# Patient Record
Sex: FEMALE
[De-identification: names, ages, dates, MRNs, and addresses within clinical notes are randomized per-mention and may not be internally consistent; named-entity substitution may affect disease eponyms.]

---

## 2022-11-14 ENCOUNTER — NON-APPOINTMENT (OUTPATIENT)
Age: 32
End: 2022-11-14

## 2022-12-05 ENCOUNTER — EMERGENCY (EMERGENCY)
Facility: HOSPITAL | Age: 32
LOS: 1 days | Discharge: HOME | End: 2022-12-05
Attending: EMERGENCY MEDICINE | Admitting: EMERGENCY MEDICINE

## 2022-12-05 VITALS
OXYGEN SATURATION: 97 % | HEART RATE: 118 BPM | SYSTOLIC BLOOD PRESSURE: 114 MMHG | RESPIRATION RATE: 20 BRPM | TEMPERATURE: 98 F | DIASTOLIC BLOOD PRESSURE: 74 MMHG | HEIGHT: 64 IN | WEIGHT: 115.08 LBS

## 2022-12-05 DIAGNOSIS — N39.0 URINARY TRACT INFECTION, SITE NOT SPECIFIED: ICD-10-CM

## 2022-12-05 DIAGNOSIS — Z20.822 CONTACT WITH AND (SUSPECTED) EXPOSURE TO COVID-19: ICD-10-CM

## 2022-12-05 DIAGNOSIS — J11.1 INFLUENZA DUE TO UNIDENTIFIED INFLUENZA VIRUS WITH OTHER RESPIRATORY MANIFESTATIONS: ICD-10-CM

## 2022-12-05 DIAGNOSIS — Z88.1 ALLERGY STATUS TO OTHER ANTIBIOTIC AGENTS STATUS: ICD-10-CM

## 2022-12-05 DIAGNOSIS — R50.9 FEVER, UNSPECIFIED: ICD-10-CM

## 2022-12-05 PROCEDURE — 99284 EMERGENCY DEPT VISIT MOD MDM: CPT | Mod: 25

## 2022-12-05 PROCEDURE — 71046 X-RAY EXAM CHEST 2 VIEWS: CPT | Mod: 26

## 2022-12-05 NOTE — ED ADULT TRIAGE NOTE - CHIEF COMPLAINT QUOTE
Pt walked into ER c/o fever and cough since this morning worsening tonight after sore throat yesterday. Pt took Tylenol 500mg x2 PTA appx 2030. Denies PMH.

## 2022-12-06 VITALS
HEART RATE: 109 BPM | SYSTOLIC BLOOD PRESSURE: 107 MMHG | OXYGEN SATURATION: 96 % | DIASTOLIC BLOOD PRESSURE: 73 MMHG | RESPIRATION RATE: 18 BRPM | TEMPERATURE: 98 F

## 2022-12-06 LAB
APPEARANCE UR: SIGNIFICANT CHANGE UP
BACTERIA # UR AUTO: ABNORMAL /HPF
BILIRUB UR-MCNC: NEGATIVE — SIGNIFICANT CHANGE UP
COLOR SPEC: YELLOW — SIGNIFICANT CHANGE UP
COMMENT - URINE: SIGNIFICANT CHANGE UP
DIFF PNL FLD: NEGATIVE — SIGNIFICANT CHANGE UP
EPI CELLS # UR: ABNORMAL /HPF (ref 0–5)
FLUAV AG NPH QL: DETECTED
FLUBV AG NPH QL: SIGNIFICANT CHANGE UP
GLUCOSE UR QL: NEGATIVE — SIGNIFICANT CHANGE UP
HCG UR QL: NEGATIVE — SIGNIFICANT CHANGE UP
KETONES UR-MCNC: NEGATIVE — SIGNIFICANT CHANGE UP
LEUKOCYTE ESTERASE UR-ACNC: ABNORMAL
NITRITE UR-MCNC: POSITIVE
PH UR: 7.5 — SIGNIFICANT CHANGE UP (ref 5–8)
PROT UR-MCNC: NEGATIVE MG/DL — SIGNIFICANT CHANGE UP
RBC CASTS # UR COMP ASSIST: ABNORMAL /HPF
RSV RNA NPH QL NAA+NON-PROBE: SIGNIFICANT CHANGE UP
SARS-COV-2 RNA SPEC QL NAA+PROBE: SIGNIFICANT CHANGE UP
SP GR SPEC: 1.01 — SIGNIFICANT CHANGE UP (ref 1–1.03)
UROBILINOGEN FLD QL: 0.2 E.U./DL — SIGNIFICANT CHANGE UP
WBC UR QL: > 10 /HPF

## 2022-12-06 PROCEDURE — 71046 X-RAY EXAM CHEST 2 VIEWS: CPT | Mod: 26

## 2022-12-06 RX ORDER — CEFUROXIME AXETIL 250 MG
1 TABLET ORAL
Qty: 10 | Refills: 0
Start: 2022-12-06 | End: 2022-12-10

## 2022-12-06 RX ORDER — IBUPROFEN 200 MG
600 TABLET ORAL ONCE
Refills: 0 | Status: COMPLETED | OUTPATIENT
Start: 2022-12-06 | End: 2022-12-06

## 2022-12-06 RX ORDER — CEFUROXIME AXETIL 250 MG
250 TABLET ORAL ONCE
Refills: 0 | Status: COMPLETED | OUTPATIENT
Start: 2022-12-06 | End: 2022-12-06

## 2022-12-06 RX ADMIN — Medication 100 MILLIGRAM(S): at 00:19

## 2022-12-06 RX ADMIN — Medication 600 MILLIGRAM(S): at 00:19

## 2022-12-06 RX ADMIN — Medication 75 MILLIGRAM(S): at 01:58

## 2022-12-06 RX ADMIN — Medication 250 MILLIGRAM(S): at 01:57

## 2022-12-06 NOTE — ED PROVIDER NOTE - ENMT, MLM
Airway patent, Nasal mucosa Inflammed w clear dc, Mouth with normal mucosa. Throat has no vesicles, no oropharyngeal exudates and uvula is midline.

## 2022-12-06 NOTE — ED PROVIDER NOTE - PATIENT PORTAL LINK FT
You can access the FollowMyHealth Patient Portal offered by [facility] by registering at the following website: [email or web address]. By joining [facility]’s FollowMyHealth portal, you will also be able to view your health information using other applications (apps) compatible with our system.

## 2022-12-06 NOTE — ED PROVIDER NOTE - PROGRESS NOTE DETAILS
labs w +influenza and UA sugestive of UTI. Started ceftin for UTI and tamiflu for influenza. Pt is well appearing. Discussed supportive tx otherwise. RTER if worsening symptoms

## 2022-12-06 NOTE — ED PROVIDER NOTE - NS ED ATTENDING STATEMENT MOD
Attending Only
Airway patent, Nasal mucosa clear. Mouth with normal mucosa. Throat has no vesicles, no oropharyngeal exudates and uvula is midline.

## 2022-12-06 NOTE — ED PROVIDER NOTE - CLINICAL SUMMARY MEDICAL DECISION MAKING FREE TEXT BOX
pt is well appearing on initial evaluation, likely developing URI given nasal congestion, sore throat and absence of any other symptoms. no dysuria, cough is dry and not productive of any sputum. Will get swab, CXR and otherwise provide symptomatic tx.

## 2022-12-06 NOTE — ED PROVIDER NOTE - OBJECTIVE STATEMENT
31 yo female pt, no hx of med problems, allergic to septra. Pt presents for fever 102-104 earlier. pt started experiencing sore throat, nasal congestion, runny nose and dry cough yesterday and today developed fever. Has been vaccinated for covid and flu. sick contacts? no sob, no leg swelling. decreased apetite.

## 2022-12-08 ENCOUNTER — NON-APPOINTMENT (OUTPATIENT)
Age: 32
End: 2022-12-08

## 2022-12-08 LAB
-  AMIKACIN: SIGNIFICANT CHANGE UP
-  AMOXICILLIN/CLAVULANIC ACID: SIGNIFICANT CHANGE UP
-  AMPICILLIN/SULBACTAM: SIGNIFICANT CHANGE UP
-  AMPICILLIN: SIGNIFICANT CHANGE UP
-  AZTREONAM: SIGNIFICANT CHANGE UP
-  CEFAZOLIN: SIGNIFICANT CHANGE UP
-  CEFEPIME: SIGNIFICANT CHANGE UP
-  CEFOXITIN: SIGNIFICANT CHANGE UP
-  CEFTRIAXONE: SIGNIFICANT CHANGE UP
-  CIPROFLOXACIN: SIGNIFICANT CHANGE UP
-  ERTAPENEM: SIGNIFICANT CHANGE UP
-  GENTAMICIN: SIGNIFICANT CHANGE UP
-  IMIPENEM: SIGNIFICANT CHANGE UP
-  LEVOFLOXACIN: SIGNIFICANT CHANGE UP
-  MEROPENEM: SIGNIFICANT CHANGE UP
-  NITROFURANTOIN: SIGNIFICANT CHANGE UP
-  PIPERACILLIN/TAZOBACTAM: SIGNIFICANT CHANGE UP
-  TOBRAMYCIN: SIGNIFICANT CHANGE UP
-  TRIMETHOPRIM/SULFAMETHOXAZOLE: SIGNIFICANT CHANGE UP
CULTURE RESULTS: SIGNIFICANT CHANGE UP
METHOD TYPE: SIGNIFICANT CHANGE UP
ORGANISM # SPEC MICROSCOPIC CNT: SIGNIFICANT CHANGE UP
ORGANISM # SPEC MICROSCOPIC CNT: SIGNIFICANT CHANGE UP
SPECIMEN SOURCE: SIGNIFICANT CHANGE UP

## 2023-06-27 ENCOUNTER — NON-APPOINTMENT (OUTPATIENT)
Age: 33
End: 2023-06-27

## 2023-07-27 ENCOUNTER — NON-APPOINTMENT (OUTPATIENT)
Age: 33
End: 2023-07-27

## 2023-09-10 ENCOUNTER — NON-APPOINTMENT (OUTPATIENT)
Age: 33
End: 2023-09-10

## 2024-01-02 PROBLEM — Z00.00 ENCOUNTER FOR PREVENTIVE HEALTH EXAMINATION: Status: ACTIVE | Noted: 2024-01-02

## 2024-02-05 ENCOUNTER — APPOINTMENT (OUTPATIENT)
Dept: PSYCHIATRY | Facility: CLINIC | Age: 34
End: 2024-02-05
Payer: SELF-PAY

## 2024-02-05 PROCEDURE — 90837 PSYTX W PT 60 MINUTES: CPT | Mod: 95

## 2024-02-07 NOTE — PHYSICAL EXAM
[Well groomed] : well groomed [Average] : average [Cooperative] : cooperative [Euthymic] : euthymic [Full] : full [Clear] : clear [Linear/Goal Directed] : linear/goal directed [None] : none [None Reported] : none reported [WNL] : within normal limits [FreeTextEntry8] : some stress and anxiety [de-identified] : congruent to mood

## 2024-02-07 NOTE — REASON FOR VISIT
[Patient preference] : as per patient preference [Telehealth (audio & video) - Individual/Group] : This visit was provided via telehealth using real-time 2-way audio visual technology. [Other Location: e.g. Home (Enter Location, City,State)___] : The provider was located at [unfilled]. [Home] : The patient, [unfilled], was located at home, [unfilled], at the time of the visit. [Verbal consent obtained from patient/other participant(s)] : Verbal consent for telehealth/telephonic services obtained from patient/other participant(s) [FreeTextEntry4] : 11:03am [FreeTextEntry5] : 11:59pm [Patient] : Patient [FreeTextEntry1] : PTSD sx's; interpersonal stressors

## 2024-02-07 NOTE — PLAN
[FreeTextEntry2] : Writer and pt collaboratively identified treatment goals which will be assessed and adjusted as needed and clinically indicated.  Problem 1: PTSD sx (hypervigilance, irritability, paranoia)  Goal 1: decrease PTSD symptoms by 30% as evidenced by PCL-5  Problem 2: Interpersonal effectiveness Goal 2: develop and utilize strategies to decrease paranoia in romantic relationship and improve interpersonal communications/skills  [Dialectical Behavior Therapy] : Dialectical Behavior Therapy  [de-identified] : This writer met with patient for individual therapy. Patient reported feeling some anxiety and nervousness around upcoming exam, though noted significant increase in her confidence. Writer praised pt and encouraged this confidence. Session focused on chain analysis of recent event that took place with pt's partner. Identified thoughts and feelings as well as moments in which pt experienced perceived rejection and lack care. Identified strategy that patient can utilize in addition to STOP skill and calling out the stories that she creates in her mind. Pt stated that she is going out of town for 2 weeks. Next session scheduled upon her return. [Recommended Frequency of Visits: ____] : Recommended frequency of visits: [unfilled] [Return in ____ week(s)] : Return in [unfilled] week(s)

## 2024-02-07 NOTE — RISK ASSESSMENT
[No, patient denies ideation or behavior] : No, patient denies ideation or behavior [FreeTextEntry8] : pt denied any current SIIP [FreeTextEntry7] : pt denied any current HIIP [No] : No

## 2024-02-21 ENCOUNTER — APPOINTMENT (OUTPATIENT)
Dept: PSYCHIATRY | Facility: CLINIC | Age: 34
End: 2024-02-21
Payer: SELF-PAY

## 2024-02-21 PROCEDURE — 90837 PSYTX W PT 60 MINUTES: CPT | Mod: 95

## 2024-02-28 ENCOUNTER — APPOINTMENT (OUTPATIENT)
Dept: PSYCHIATRY | Facility: CLINIC | Age: 34
End: 2024-02-28
Payer: MEDICAID

## 2024-02-28 PROCEDURE — 90837 PSYTX W PT 60 MINUTES: CPT | Mod: 95

## 2024-02-28 NOTE — REASON FOR VISIT
[Patient preference] : as per patient preference [Telehealth (audio & video) - Individual/Group] : This visit was provided via telehealth using real-time 2-way audio visual technology. [Other Location: e.g. Home (Enter Location, City,State)___] : The provider was located at [unfilled]. [Home] : The patient, [unfilled], was located at home, [unfilled], at the time of the visit. [Verbal consent obtained from patient/other participant(s)] : Verbal consent for telehealth/telephonic services obtained from patient/other participant(s) [FreeTextEntry4] : 11:01am [FreeTextEntry5] : 12:00pm [Patient] : Patient [FreeTextEntry1] : interpersonal stresors; stress/anxiety

## 2024-02-28 NOTE — PLAN
[FreeTextEntry2] : Writer and pt collaboratively identified treatment goals which will be assessed and adjusted as needed and clinically indicated.  Problem 1: PTSD sx (hypervigilance, irritability, paranoia)  Goal 1: decrease PTSD symptoms by 30% as evidenced by PCL-5  Problem 2: Interpersonal effectiveness Goal 2: develop and utilize strategies to decrease paranoia in romantic relationship and improve interpersonal communications/skills  [Cognitive and/or Behavior Therapy] : Cognitive and/or Behavior Therapy  [de-identified] : This writer met with patient for individual therapy. Pt shared that she is feeling burnout related to studying and maintaining focus on exam. Offered space to process emotions and thoughts related. Reiterated plan and strategies to utilize to move forward. Provided motivation and encouragement. Additionally, processed recent situations that occurred with partner in which pt felt ignored or not valued/ cared about. Identified effective means of communicating these observations and feelings to partner as well as potential solutions to minimize issue. Discussed effectiveness of recently focusing less on what her partner is/isnt doing- to which pt noted this felt helpful and "freeing." Assigned patient homework to continue practicing new approach an increasing awareness around how this impacts her mood in context of relationship. [Recommended Frequency of Visits: ____] : Recommended frequency of visits: [unfilled] [Return in ____ week(s)] : Return in [unfilled] week(s)

## 2024-02-28 NOTE — PHYSICAL EXAM
[Well groomed] : well groomed [Average] : average [Cooperative] : cooperative [Euthymic] : euthymic [Full] : full [Clear] : clear [Linear/Goal Directed] : linear/goal directed [None] : none [None Reported] : none reported [WNL] : within normal limits [FreeTextEntry8] : some stress and anxiety [de-identified] : congruent to mood

## 2024-03-05 ENCOUNTER — APPOINTMENT (OUTPATIENT)
Dept: PSYCHIATRY | Facility: CLINIC | Age: 34
End: 2024-03-05
Payer: SELF-PAY

## 2024-03-05 PROCEDURE — 90837 PSYTX W PT 60 MINUTES: CPT | Mod: 95

## 2024-03-05 NOTE — PHYSICAL EXAM
[Well groomed] : well groomed [Average] : average [Cooperative] : cooperative [Euthymic] : euthymic [Full] : full [Clear] : clear [Linear/Goal Directed] : linear/goal directed [None] : none [None Reported] : none reported [WNL] : within normal limits [FreeTextEntry8] : some stress and anxiety [de-identified] : congruent to mood

## 2024-03-05 NOTE — REASON FOR VISIT
[Patient preference] : as per patient preference [Telehealth (audio & video) - Individual/Group] : This visit was provided via telehealth using real-time 2-way audio visual technology. [Other Location: e.g. Home (Enter Location, City,State)___] : The provider was located at [unfilled]. [Home] : The patient, [unfilled], was located at home, [unfilled], at the time of the visit. [Verbal consent obtained from patient/other participant(s)] : Verbal consent for telehealth/telephonic services obtained from patient/other participant(s) [FreeTextEntry4] : 10:01am [FreeTextEntry5] : 11:00am [Patient] : Patient [FreeTextEntry1] : interpersonal stressors

## 2024-03-05 NOTE — PLAN
[FreeTextEntry2] : Writer and pt collaboratively identified treatment goals which will be assessed and adjusted as needed and clinically indicated.  Problem 1: PTSD sx (hypervigilance, irritability, paranoia)  Goal 1: decrease PTSD symptoms by 30% as evidenced by PCL-5  Problem 2: Interpersonal effectiveness Goal 2: develop and utilize strategies to decrease paranoia in romantic relationship and improve interpersonal communications/skills  [Cognitive and/or Behavior Therapy] : Cognitive and/or Behavior Therapy  [de-identified] : This writer met with patient for individual therapy. Session focused on processing recent situations that occurred with partner in which pt felt lack of partnership. Reflectively listened and offered space for patient to process thoughts and feelings. Identified effective means of communicating these observations and feelings to partner. Will continue to explore and process in follow-up. [Recommended Frequency of Visits: ____] : Recommended frequency of visits: [unfilled] [Return in ____ week(s)] : Return in [unfilled] week(s)

## 2024-03-05 NOTE — END OF VISIT
[Individual Psychotherapy for 53+ minutes] : Individual Psychotherapy for 53+ minutes  [Duration of Psychotherapy Visit (minutes spent in synchronous communication): ____] : Duration of Psychotherapy Visit (minutes spent in synchronous communication): [unfilled] [Licensed Clinician] : Licensed Clinician

## 2024-03-15 ENCOUNTER — APPOINTMENT (OUTPATIENT)
Dept: PSYCHIATRY | Facility: CLINIC | Age: 34
End: 2024-03-15
Payer: MEDICAID

## 2024-03-15 PROCEDURE — 90837 PSYTX W PT 60 MINUTES: CPT | Mod: 95

## 2024-03-15 NOTE — PLAN
[FreeTextEntry2] : Writer and pt collaboratively identified treatment goals which will be assessed and adjusted as needed and clinically indicated.  Problem 1: PTSD sx (hypervigilance, irritability, paranoia)  Goal 1: decrease PTSD symptoms by 30% as evidenced by PCL-5  Problem 2: Interpersonal effectiveness Goal 2: develop and utilize strategies to decrease paranoia in romantic relationship and improve interpersonal communications/skills  [Cognitive and/or Behavior Therapy] : Cognitive and/or Behavior Therapy  [de-identified] : This writer met with patient for individual therapy. Session focused on further processing recent situations that occurred with partner in which pt felt lack of partnership. Processed areas in which pt noted lack of partnership in entirety of relationship. Themes of trust were explored. Provided psychoeducation around trust not being all or nothing. Reflectively listened and offered space for patient to process thoughts and feelings. Identified effective means of communicating these observations and feelings to partner. Encouraged pt to journal around what she can and cannot trust her partner with. Will continue to explore and process in follow-up. [Recommended Frequency of Visits: ____] : Recommended frequency of visits: [unfilled] [Return in ____ week(s)] : Return in [unfilled] week(s)

## 2024-03-15 NOTE — PHYSICAL EXAM
[Well groomed] : well groomed [Average] : average [Cooperative] : cooperative [Euthymic] : euthymic [Full] : full [Clear] : clear [Linear/Goal Directed] : linear/goal directed [None] : none [None Reported] : none reported [WNL] : within normal limits [FreeTextEntry8] : some stress and anxiety [de-identified] : congruent to mood

## 2024-03-15 NOTE — REASON FOR VISIT
[Patient preference] : as per patient preference [Telehealth (audio & video) - Individual/Group] : This visit was provided via telehealth using real-time 2-way audio visual technology. [Other Location: e.g. Home (Enter Location, City,State)___] : The provider was located at [unfilled]. [Home] : The patient, [unfilled], was located at home, [unfilled], at the time of the visit. [Verbal consent obtained from patient/other participant(s)] : Verbal consent for telehealth/telephonic services obtained from patient/other participant(s) [FreeTextEntry4] : 12:00pm [Patient] : Patient [FreeTextEntry5] : 1:00pm [FreeTextEntry1] : relation stress; betrayal

## 2024-03-19 ENCOUNTER — APPOINTMENT (OUTPATIENT)
Dept: PSYCHIATRY | Facility: CLINIC | Age: 34
End: 2024-03-19
Payer: MEDICAID

## 2024-03-19 PROCEDURE — 90837 PSYTX W PT 60 MINUTES: CPT | Mod: 95

## 2024-03-20 ENCOUNTER — TRANSCRIPTION ENCOUNTER (OUTPATIENT)
Age: 34
End: 2024-03-20

## 2024-03-20 NOTE — PLAN
[FreeTextEntry2] : Writer and pt collaboratively identified treatment goals which will be assessed and adjusted as needed and clinically indicated.  Problem 1: PTSD sx (hypervigilance, irritability, paranoia)  Goal 1: decrease PTSD symptoms by 30% as evidenced by PCL-5  Problem 2: Interpersonal effectiveness Goal 2: develop and utilize strategies to decrease paranoia in romantic relationship and improve interpersonal communications/skills  [Cognitive and/or Behavior Therapy] : Cognitive and/or Behavior Therapy  [de-identified] : This writer met with patient for individual therapy. Session focused on processing trust within the relationship with her partner. Specific values and beliefs of relationships were deeper explored. Processed areas in which pt noted lack of partnership in entirety of relationship. Reflectively listened and offered space for patient to process thoughts and feelings. Will continue to explore and process in follow-up. [Recommended Frequency of Visits: ____] : Recommended frequency of visits: [unfilled] [Return in ____ week(s)] : Return in [unfilled] week(s)

## 2024-03-20 NOTE — REASON FOR VISIT
[Patient preference] : as per patient preference [Telehealth (audio & video) - Individual/Group] : This visit was provided via telehealth using real-time 2-way audio visual technology. [Other Location: e.g. Home (Enter Location, City,State)___] : The provider was located at [unfilled]. [Home] : The patient, [unfilled], was located at home, [unfilled], at the time of the visit. [FreeTextEntry4] : 3:03pm [Verbal consent obtained from patient/other participant(s)] : Verbal consent for telehealth/telephonic services obtained from patient/other participant(s) [FreeTextEntry5] : 3:58pm [Patient] : Patient [FreeTextEntry1] : Interpersonal effectiveness and relational stress

## 2024-03-26 ENCOUNTER — APPOINTMENT (OUTPATIENT)
Dept: PSYCHIATRY | Facility: CLINIC | Age: 34
End: 2024-03-26
Payer: MEDICAID

## 2024-03-26 PROCEDURE — 90837 PSYTX W PT 60 MINUTES: CPT | Mod: 95

## 2024-03-26 NOTE — PHYSICAL EXAM
[Well groomed] : well groomed [Average] : average [Cooperative] : cooperative [Euthymic] : euthymic [Full] : full [Clear] : clear [Linear/Goal Directed] : linear/goal directed [None] : none [None Reported] : none reported [WNL] : within normal limits [FreeTextEntry8] : some stress and anxiety [de-identified] : congruent to mood

## 2024-03-26 NOTE — REASON FOR VISIT
[Patient preference] : as per patient preference [Telehealth (audio & video) - Individual/Group] : This visit was provided via telehealth using real-time 2-way audio visual technology. [Other Location: e.g. Home (Enter Location, City,State)___] : The provider was located at [unfilled]. [Home] : The patient, [unfilled], was located at home, [unfilled], at the time of the visit. [Verbal consent obtained from patient/other participant(s)] : Verbal consent for telehealth/telephonic services obtained from patient/other participant(s) [FreeTextEntry4] : 1:03pm [FreeTextEntry5] : 2:01pm [Patient] : Patient [FreeTextEntry1] : interpersonal communication and relational issues Dr. Johnson

## 2024-03-26 NOTE — PLAN
[FreeTextEntry2] : Writer and pt collaboratively identified treatment goals which will be assessed and adjusted as needed and clinically indicated.  Problem 1: PTSD sx (hypervigilance, irritability, paranoia)  Goal 1: decrease PTSD symptoms by 30% as evidenced by PCL-5  Problem 2: Interpersonal effectiveness Goal 2: develop and utilize strategies to decrease paranoia in romantic relationship and improve interpersonal communications/skills  [Cognitive and/or Behavior Therapy] : Cognitive and/or Behavior Therapy  [de-identified] : This writer met with patient for individual therapy. Session focused on processing anxiety and stress related to EPPP exam as well as trust within the relationship with her partner. Continued to explore held values/beliefs regarding relationships. Further processed areas in which pt noted lack of partnership in entirety of relationship. Reflectively listened and offered space for patient to process thoughts and feelings. Identified more effective means of communication that patient can utilize when interacting with partner as well as emotion regulation techniques to remain in wise mind. Will continue to explore and process in follow-up. [Recommended Frequency of Visits: ____] : Recommended frequency of visits: [unfilled] [Return in ____ week(s)] : Return in [unfilled] week(s)

## 2024-04-03 ENCOUNTER — APPOINTMENT (OUTPATIENT)
Dept: PSYCHIATRY | Facility: CLINIC | Age: 34
End: 2024-04-03
Payer: MEDICAID

## 2024-04-03 PROCEDURE — 90837 PSYTX W PT 60 MINUTES: CPT | Mod: 95

## 2024-04-03 NOTE — PHYSICAL EXAM
[Well groomed] : well groomed [Average] : average [Cooperative] : cooperative [Euthymic] : euthymic [Full] : full [Clear] : clear [Linear/Goal Directed] : linear/goal directed [None] : none [None Reported] : none reported [WNL] : within normal limits [FreeTextEntry8] : some stress and anxiety [de-identified] : congruent to mood

## 2024-04-03 NOTE — PLAN
[FreeTextEntry2] : Writer and pt collaboratively identified treatment goals which will be assessed and adjusted as needed and clinically indicated.  Problem 1: PTSD sx (hypervigilance, irritability, paranoia)  Goal 1: decrease PTSD symptoms by 30% as evidenced by PCL-5  Problem 2: Interpersonal effectiveness Goal 2: develop and utilize strategies to decrease paranoia in romantic relationship and improve interpersonal communications/skills  [Cognitive and/or Behavior Therapy] : Cognitive and/or Behavior Therapy  [de-identified] : This writer met with patient for individual therapy. Session focused on processing stress related to relationship with her partner. Explored recent events that took place and examined feelings, thoughts, and behaviors associated. Identified areas in which pt could remove herself from the situation to gain space and perspective on her own thoughts/feelings in the moment as well as other strategies to utilize when engaging w partner. Will continue to explore and process in follow-up. [Recommended Frequency of Visits: ____] : Recommended frequency of visits: [unfilled] [Return in ____ week(s)] : Return in [unfilled] week(s)

## 2024-04-03 NOTE — END OF VISIT
[Individual Psychotherapy for 53+ minutes] : Individual Psychotherapy for 53+ minutes  [Duration of Psychotherapy Visit (minutes spent in synchronous communication): ____] : Duration of Psychotherapy Visit (minutes spent in synchronous communication): [unfilled] [Licensed Clinician] : Licensed Clinician [Teletherapy Service Provided] : The services provided in this session were delivered via tele-therapy

## 2024-04-03 NOTE — REASON FOR VISIT
[Patient preference] : as per patient preference [Telehealth (audio & video) - Individual/Group] : This visit was provided via telehealth using real-time 2-way audio visual technology. [Other Location: e.g. Home (Enter Location, City,State)___] : The provider was located at [unfilled]. [Home] : The patient, [unfilled], was located at home, [unfilled], at the time of the visit. [Verbal consent obtained from patient/other participant(s)] : Verbal consent for telehealth/telephonic services obtained from patient/other participant(s) [FreeTextEntry5] : 12:05pm [FreeTextEntry4] : 11:05am [Patient] : Patient [FreeTextEntry1] : interpersonal/relational stressors; effective communication

## 2024-04-10 ENCOUNTER — APPOINTMENT (OUTPATIENT)
Dept: PSYCHIATRY | Facility: CLINIC | Age: 34
End: 2024-04-10
Payer: MEDICAID

## 2024-04-10 PROCEDURE — 90837 PSYTX W PT 60 MINUTES: CPT | Mod: 95

## 2024-04-11 NOTE — REASON FOR VISIT
[Patient preference] : as per patient preference [Telehealth (audio & video) - Individual/Group] : This visit was provided via telehealth using real-time 2-way audio visual technology. [Other Location: e.g. Home (Enter Location, City,State)___] : The provider was located at [unfilled]. [Home] : The patient, [unfilled], was located at home, [unfilled], at the time of the visit. [Verbal consent obtained from patient/other participant(s)] : Verbal consent for telehealth/telephonic services obtained from patient/other participant(s) [FreeTextEntry4] : 1:35pm [FreeTextEntry5] : 2:38pm [Patient] : Patient [FreeTextEntry1] : interpersonal stressors

## 2024-04-11 NOTE — PLAN
[FreeTextEntry2] : Writer and pt collaboratively identified treatment goals which will be assessed and adjusted as needed and clinically indicated.  Problem 1: PTSD sx (hypervigilance, irritability, paranoia)  Goal 1: decrease PTSD symptoms by 30% as evidenced by PCL-5  Problem 2: Interpersonal effectiveness Goal 2: develop and utilize strategies to decrease paranoia in romantic relationship and improve interpersonal communications/skills  [Cognitive and/or Behavior Therapy] : Cognitive and/or Behavior Therapy  [Dialectical Behavior Therapy] : Dialectical Behavior Therapy  [de-identified] : This writer met with patient for individual therapy. Session focused on processing stress related to current long-term relationship. Explored recent events that took place and examined feelings, thoughts, and behaviors associated. Themes of disappointment, betrayal, and anger were explored. Identified areas in which pt could remove herself from the situation to gain space and perspective on her own thoughts/feelings in the moment as well as other strategies to utilize when engaging w partner. Reviewed STOP skill. Will continue to explore and process in follow-up. [Recommended Frequency of Visits: ____] : Recommended frequency of visits: [unfilled] [Return in ____ week(s)] : Return in [unfilled] week(s)

## 2024-04-11 NOTE — PHYSICAL EXAM
[Well groomed] : well groomed [Average] : average [Cooperative] : cooperative [Euthymic] : euthymic [Full] : full [Clear] : clear [Linear/Goal Directed] : linear/goal directed [None] : none [None Reported] : none reported [WNL] : within normal limits [FreeTextEntry8] : some stress and anxiety [de-identified] : congruent to mood

## 2024-04-16 ENCOUNTER — APPOINTMENT (OUTPATIENT)
Dept: PSYCHIATRY | Facility: CLINIC | Age: 34
End: 2024-04-16
Payer: MEDICAID

## 2024-04-16 PROCEDURE — 90837 PSYTX W PT 60 MINUTES: CPT | Mod: 95

## 2024-04-16 NOTE — REASON FOR VISIT
[Patient preference] : as per patient preference [Telehealth (audio & video) - Individual/Group] : This visit was provided via telehealth using real-time 2-way audio visual technology. [Other Location: e.g. Home (Enter Location, City,State)___] : The provider was located at [unfilled]. [Home] : The patient, [unfilled], was located at home, [unfilled], at the time of the visit. [Verbal consent obtained from patient/other participant(s)] : Verbal consent for telehealth/telephonic services obtained from patient/other participant(s) [FreeTextEntry4] : 1:12pm [FreeTextEntry5] : 2:05pm [Patient] : Patient [FreeTextEntry1] : relational stressors; betrayal

## 2024-04-16 NOTE — PHYSICAL EXAM
[Well groomed] : well groomed [Average] : average [Cooperative] : cooperative [Euthymic] : euthymic [Full] : full [Clear] : clear [Linear/Goal Directed] : linear/goal directed [None] : none [None Reported] : none reported [WNL] : within normal limits [FreeTextEntry8] : some stress and anxiety [de-identified] : congruent to mood

## 2024-04-16 NOTE — PLAN
[FreeTextEntry2] : Writer and pt collaboratively identified treatment goals which will be assessed and adjusted as needed and clinically indicated.  Problem 1: PTSD sx (hypervigilance, irritability, paranoia)  Goal 1: decrease PTSD symptoms by 30% as evidenced by PCL-5  Problem 2: Interpersonal effectiveness Goal 2: develop and utilize strategies to decrease paranoia in romantic relationship and improve interpersonal communications/skills  [Cognitive and/or Behavior Therapy] : Cognitive and/or Behavior Therapy  [de-identified] : This writer met with patient for individual therapy. Session focused on processing stress related to current long-term relationship. Explored recent events that took place in relationship and examined feelings, thoughts, and behaviors associated. Themes of trust, betrayal, and anger were explored. Processed experience with resisting the urge to argue and rather sit with the stress. Pt reported that she was able to do so. Described how this felt uncomfortable but doable. Identified areas in which pt could continue to remove herself from the situation to gain space and perspective on her own thoughts/feelings in the moment as well as other strategies to utilize when engaging w partner. Will continue to explore and process in follow-up. [Recommended Frequency of Visits: ____] : Recommended frequency of visits: [unfilled] [Return in ____ week(s)] : Return in [unfilled] week(s)

## 2024-04-24 ENCOUNTER — APPOINTMENT (OUTPATIENT)
Dept: PSYCHIATRY | Facility: CLINIC | Age: 34
End: 2024-04-24
Payer: MEDICAID

## 2024-04-24 PROCEDURE — 90837 PSYTX W PT 60 MINUTES: CPT | Mod: 95

## 2024-04-24 NOTE — PLAN
[FreeTextEntry2] : Writer and pt collaboratively identified treatment goals which will be assessed and adjusted as needed and clinically indicated.  Problem 1: PTSD sx (hypervigilance, irritability, paranoia)  Goal 1: decrease PTSD symptoms by 30% as evidenced by PCL-5  Problem 2: Interpersonal effectiveness Goal 2: develop and utilize strategies to decrease paranoia in romantic relationship and improve interpersonal communications/skills  [Cognitive and/or Behavior Therapy] : Cognitive and/or Behavior Therapy  [de-identified] : This writer met with patient for individual therapy. Session focused on processing conflict and issues that exist in current long-term relationship. Explored recent events that took place in relationship and examined feelings, thoughts, and behaviors associated. Themes of trust, betrayal continue to remain primary. Processed experience with resisting the urge to argue and rather sit with the stress. Pt reported that she was able to do so more consistently this past week as it was her partners birthday. Identified areas in which pt could continue to remove herself from the situation to gain space and perspective on her own thoughts/feelings in the moment as well as working on de-personalizing some of her partners actions. Will continue to explore and process in follow-up. [Recommended Frequency of Visits: ____] : Recommended frequency of visits: [unfilled] [Return in ____ week(s)] : Return in [unfilled] week(s)

## 2024-04-24 NOTE — PHYSICAL EXAM
[Well groomed] : well groomed [Average] : average [Cooperative] : cooperative [Euthymic] : euthymic [Full] : full [Clear] : clear [Linear/Goal Directed] : linear/goal directed [None] : none [None Reported] : none reported [WNL] : within normal limits [FreeTextEntry8] : some stress and anxiety [de-identified] : congruent to mood

## 2024-04-24 NOTE — REASON FOR VISIT
[Patient preference] : as per patient preference [Telehealth (audio & video) - Individual/Group] : This visit was provided via telehealth using real-time 2-way audio visual technology. [Other Location: e.g. Home (Enter Location, City,State)___] : The provider was located at [unfilled]. [Home] : The patient, [unfilled], was located at home, [unfilled], at the time of the visit. [Verbal consent obtained from patient/other participant(s)] : Verbal consent for telehealth/telephonic services obtained from patient/other participant(s) [FreeTextEntry4] : 12:15pm [FreeTextEntry5] : 1:12pm [Patient] : Patient [FreeTextEntry1] : interpersonal conflict; betrayal

## 2024-05-01 ENCOUNTER — APPOINTMENT (OUTPATIENT)
Dept: PSYCHIATRY | Facility: CLINIC | Age: 34
End: 2024-05-01
Payer: MEDICAID

## 2024-05-01 PROCEDURE — 90837 PSYTX W PT 60 MINUTES: CPT | Mod: 95

## 2024-05-01 NOTE — REASON FOR VISIT
[Patient preference] : as per patient preference [Telehealth (audio & video) - Individual/Group] : This visit was provided via telehealth using real-time 2-way audio visual technology. [Other Location: e.g. Home (Enter Location, City,State)___] : The provider was located at [unfilled]. [Home] : The patient, [unfilled], was located at home, [unfilled], at the time of the visit. [Verbal consent obtained from patient/other participant(s)] : Verbal consent for telehealth/telephonic services obtained from patient/other participant(s) [FreeTextEntry4] : 12:03pm [FreeTextEntry5] : 1:09pm [Patient] : Patient [FreeTextEntry1] : relational issues; anxiety

## 2024-05-01 NOTE — PLAN
[FreeTextEntry2] : Writer and pt collaboratively identified treatment goals which will be assessed and adjusted as needed and clinically indicated.  Problem 1: PTSD sx (hypervigilance, irritability, paranoia)  Goal 1: decrease PTSD symptoms by 30% as evidenced by PCL-5  Problem 2: Interpersonal effectiveness Goal 2: develop and utilize strategies to decrease paranoia in romantic relationship and improve interpersonal communications/skills  [Cognitive and/or Behavior Therapy] : Cognitive and/or Behavior Therapy  [de-identified] : This writer met with patient for individual therapy. Pt completed self-report measures endorsing low mood and moderate anxiety symptoms. Session focused on processing conflict and issues that exist in current long-term relationship as well as general worry that pt has been experiencing consistently. Explored past events and where they fall on the distress scale- showing pt that not every situation is a 9 or 10. Identified areas in which pt could continue to remove herself or not engage in confrontation.  Will continue to explore and process in follow-up.  KIM-7= 13 (likely indicates moderate anxiety sx's) PHQ-9= 8 (likely indicative of mild depression sx's) [Recommended Frequency of Visits: ____] : Recommended frequency of visits: [unfilled] [Return in ____ week(s)] : Return in [unfilled] week(s)

## 2024-05-01 NOTE — PHYSICAL EXAM
[Well groomed] : well groomed [Average] : average [Cooperative] : cooperative [Euthymic] : euthymic [Anxious] : anxious [Full] : full [Clear] : clear [Linear/Goal Directed] : linear/goal directed [None] : none [None Reported] : none reported [WNL] : within normal limits [FreeTextEntry8] : some stress and anxiety [de-identified] : congruent to mood

## 2024-05-08 ENCOUNTER — APPOINTMENT (OUTPATIENT)
Dept: PSYCHIATRY | Facility: CLINIC | Age: 34
End: 2024-05-08
Payer: MEDICAID

## 2024-05-08 PROCEDURE — 90837 PSYTX W PT 60 MINUTES: CPT | Mod: 95

## 2024-05-08 NOTE — REASON FOR VISIT
[Patient preference] : as per patient preference [Telehealth (audio & video) - Individual/Group] : This visit was provided via telehealth using real-time 2-way audio visual technology. [Other Location: e.g. Home (Enter Location, City,State)___] : The provider was located at [unfilled]. [Home] : The patient, [unfilled], was located at home, [unfilled], at the time of the visit. [Verbal consent obtained from patient/other participant(s)] : Verbal consent for telehealth/telephonic services obtained from patient/other participant(s) [FreeTextEntry4] : 12:04pm [FreeTextEntry5] : 1:02pm [Patient] : Patient [FreeTextEntry1] : life stressors, relational difficulties

## 2024-05-08 NOTE — PLAN
[FreeTextEntry2] : Writer and pt collaboratively identified treatment goals which will be assessed and adjusted as needed and clinically indicated.  Problem 1: PTSD sx (hypervigilance, irritability, paranoia)  Goal 1: decrease PTSD symptoms by 30% as evidenced by PCL-5  Problem 2: Interpersonal effectiveness Goal 2: develop and utilize strategies to decrease paranoia in romantic relationship and improve interpersonal communications/skills  [Cognitive and/or Behavior Therapy] : Cognitive and/or Behavior Therapy  [de-identified] : This writer met with patient for individual therapy. 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 Explored previous events and pts beliefs around these experiences. Used downward arrow and socratic dialogue to identify further core beliefs and issues that pt may not be as actively aware of. Will continue to explore and process in follow-up.   [Recommended Frequency of Visits: ____] : Recommended frequency of visits: [unfilled] [Return in ____ week(s)] : Return in [unfilled] week(s)

## 2024-05-08 NOTE — PHYSICAL EXAM
[Well groomed] : well groomed [Average] : average [Cooperative] : cooperative [Euthymic] : euthymic [Anxious] : anxious [Full] : full [Clear] : clear [Linear/Goal Directed] : linear/goal directed [None] : none [None Reported] : none reported [WNL] : within normal limits [FreeTextEntry8] : some stress and anxiety [de-identified] : congruent to mood

## 2024-05-14 ENCOUNTER — APPOINTMENT (OUTPATIENT)
Dept: PSYCHIATRY | Facility: CLINIC | Age: 34
End: 2024-05-14
Payer: MEDICAID

## 2024-05-14 PROCEDURE — 90837 PSYTX W PT 60 MINUTES: CPT | Mod: 95

## 2024-05-14 NOTE — PLAN
[FreeTextEntry2] : Writer and pt collaboratively identified treatment goals which will be assessed and adjusted as needed and clinically indicated.  Problem 1: PTSD sx (hypervigilance, irritability, paranoia)  Goal 1: decrease PTSD symptoms by 30% as evidenced by PCL-5  Problem 2: Interpersonal effectiveness Goal 2: develop and utilize strategies to decrease paranoia in romantic relationship and improve interpersonal communications/skills  [Cognitive and/or Behavior Therapy] : Cognitive and/or Behavior Therapy  [de-identified] : This writer met with patient for individual therapy. Session focused on processing recent events that took place in relationship. Explored recent events and pts beliefs around these experiences, specifically her expectations of her partner. Identified lapses in communication and areas in which pt could be more explicit with partner in what she wants to do or what she would like her partner to do. Used socratic dialogue to point out holes in pt's logic and to reaffirm importance of stone mind approach. Will continue to explore and process in follow-up.   [Recommended Frequency of Visits: ____] : Recommended frequency of visits: [unfilled] [Return in ____ week(s)] : Return in [unfilled] week(s)

## 2024-05-14 NOTE — REASON FOR VISIT
[Patient preference] : as per patient preference [Telehealth (audio & video) - Individual/Group] : This visit was provided via telehealth using real-time 2-way audio visual technology. [Other Location: e.g. Home (Enter Location, City,State)___] : The provider was located at [unfilled]. [Home] : The patient, [unfilled], was located at home, [unfilled], at the time of the visit. [Verbal consent obtained from patient/other participant(s)] : Verbal consent for telehealth/telephonic services obtained from patient/other participant(s) [FreeTextEntry4] : 11:06am [FreeTextEntry5] : 12:04pm [Patient] : Patient [FreeTextEntry1] : relational stress

## 2024-05-14 NOTE — PHYSICAL EXAM
[Well groomed] : well groomed [Average] : average [Cooperative] : cooperative [Euthymic] : euthymic [Anxious] : anxious [Full] : full [Clear] : clear [Linear/Goal Directed] : linear/goal directed [None] : none [None Reported] : none reported [WNL] : within normal limits [FreeTextEntry8] : some stress and anxiety [de-identified] : congruent to mood

## 2024-05-15 ENCOUNTER — APPOINTMENT (OUTPATIENT)
Dept: PSYCHIATRY | Facility: CLINIC | Age: 34
End: 2024-05-15

## 2024-05-21 ENCOUNTER — APPOINTMENT (OUTPATIENT)
Dept: PSYCHIATRY | Facility: CLINIC | Age: 34
End: 2024-05-21
Payer: MEDICAID

## 2024-05-21 PROCEDURE — 90837 PSYTX W PT 60 MINUTES: CPT | Mod: 95

## 2024-05-21 NOTE — PHYSICAL EXAM
[Well groomed] : well groomed [Average] : average [Cooperative] : cooperative [Euthymic] : euthymic [Anxious] : anxious [Full] : full [Clear] : clear [Linear/Goal Directed] : linear/goal directed [None] : none [None Reported] : none reported [WNL] : within normal limits [FreeTextEntry8] : some stress and anxiety [de-identified] : congruent to mood

## 2024-05-21 NOTE — PLAN
[FreeTextEntry2] : Writer and pt collaboratively identified treatment goals which will be assessed and adjusted as needed and clinically indicated.  Problem 1: PTSD sx (hypervigilance, irritability, paranoia)  Goal 1: decrease PTSD symptoms by 30% as evidenced by PCL-5  Problem 2: Interpersonal effectiveness Goal 2: develop and utilize strategies to decrease paranoia in romantic relationship and improve interpersonal communications/skills  [Cognitive and/or Behavior Therapy] : Cognitive and/or Behavior Therapy  [de-identified] : This writer met with patient for individual therapy. Session focused on processing recent events that took place in relationship. Explored recent events and pts beliefs around these experiences, specifically her expectations/boundaries of her partner.pt indicated that the recent practice of being more explicit and clearer with needs has been significantly impactful. Pt expressed feeling less annoyed and more content w partner. Used socratic dialogue to point out holes in pt's logic and to reaffirm importance of working through the issues rather than avoiding. Will continue to explore and process in follow-up.   [Recommended Frequency of Visits: ____] : Recommended frequency of visits: [unfilled] [Return in ____ week(s)] : Return in [unfilled] week(s)

## 2024-05-21 NOTE — REASON FOR VISIT
[Patient preference] : as per patient preference [Telehealth (audio & video) - Individual/Group] : This visit was provided via telehealth using real-time 2-way audio visual technology. [Other Location: e.g. Home (Enter Location, City,State)___] : The provider was located at [unfilled]. [Home] : The patient, [unfilled], was located at home, [unfilled], at the time of the visit. [Verbal consent obtained from patient/other participant(s)] : Verbal consent for telehealth/telephonic services obtained from patient/other participant(s) [FreeTextEntry4] : 1:05pm [FreeTextEntry5] : 2:03pm [Patient] : Patient [FreeTextEntry1] : relational conflict; anxiety

## 2024-05-28 ENCOUNTER — APPOINTMENT (OUTPATIENT)
Dept: PSYCHIATRY | Facility: CLINIC | Age: 34
End: 2024-05-28
Payer: MEDICAID

## 2024-05-28 PROCEDURE — 90837 PSYTX W PT 60 MINUTES: CPT | Mod: 95

## 2024-05-28 NOTE — REASON FOR VISIT
[Patient preference] : as per patient preference [Telehealth (audio & video) - Individual/Group] : This visit was provided via telehealth using real-time 2-way audio visual technology. [Other Location: e.g. Home (Enter Location, City,State)___] : The provider was located at [unfilled]. [Home] : The patient, [unfilled], was located at home, [unfilled], at the time of the visit. [Verbal consent obtained from patient/other participant(s)] : Verbal consent for telehealth/telephonic services obtained from patient/other participant(s) [FreeTextEntry4] : 1:08pm [FreeTextEntry5] : 2:01pm [Patient] : Patient [FreeTextEntry1] : relational stress; test related anxiety

## 2024-05-28 NOTE — PHYSICAL EXAM
[Well groomed] : well groomed [Average] : average [Cooperative] : cooperative [Euthymic] : euthymic [Anxious] : anxious [Full] : full [Clear] : clear [Linear/Goal Directed] : linear/goal directed [None] : none [None Reported] : none reported [WNL] : within normal limits [FreeTextEntry8] : some stress and anxiety [de-identified] : congruent to mood

## 2024-05-28 NOTE — PLAN
[FreeTextEntry2] : Writer and pt collaboratively identified treatment goals which will be assessed and adjusted as needed and clinically indicated.  Problem 1: PTSD sx (hypervigilance, irritability, paranoia)  Goal 1: decrease PTSD symptoms by 30% as evidenced by PCL-5  Problem 2: Interpersonal effectiveness Goal 2: develop and utilize strategies to decrease paranoia in romantic relationship and improve interpersonal communications/skills  [Cognitive and/or Behavior Therapy] : Cognitive and/or Behavior Therapy  [de-identified] : This writer met with patient for individual therapy. Session focused on processing recent events that took place in relationship as well as with sibling visiting. Explored recent events and pts beliefs around these experiences, specifically her perspective of pt's partner being controlling. Identified ways in which pt may have been seeing herself in the partner and did not like how the behavior looked as an observer. Identified differences between control, boundaries, and effective communication. Will continue to explore and process in follow-up.   [Recommended Frequency of Visits: ____] : Recommended frequency of visits: [unfilled] [Return in ____ week(s)] : Return in [unfilled] week(s)

## 2024-06-04 ENCOUNTER — APPOINTMENT (OUTPATIENT)
Dept: PSYCHIATRY | Facility: CLINIC | Age: 34
End: 2024-06-04
Payer: MEDICAID

## 2024-06-04 PROCEDURE — 90837 PSYTX W PT 60 MINUTES: CPT | Mod: 95

## 2024-06-04 NOTE — REASON FOR VISIT
[Patient preference] : as per patient preference [Telehealth (audio & video) - Individual/Group] : This visit was provided via telehealth using real-time 2-way audio visual technology. [Other Location: e.g. Home (Enter Location, City,State)___] : The provider was located at [unfilled]. [Home] : The patient, [unfilled], was located at home, [unfilled], at the time of the visit. [Verbal consent obtained from patient/other participant(s)] : Verbal consent for telehealth/telephonic services obtained from patient/other participant(s) [FreeTextEntry4] : 11:05am [FreeTextEntry5] : 12:01pm [Patient] : Patient [FreeTextEntry1] : anxiety; stress

## 2024-06-04 NOTE — PLAN
[FreeTextEntry2] : Writer and pt collaboratively identified treatment goals which will be assessed and adjusted as needed and clinically indicated.  Problem 1: PTSD sx (hypervigilance, irritability, paranoia)  Goal 1: decrease PTSD symptoms by 30% as evidenced by PCL-5  Problem 2: Interpersonal effectiveness Goal 2: develop and utilize strategies to decrease paranoia in romantic relationship and improve interpersonal communications/skills  [Cognitive and/or Behavior Therapy] : Cognitive and/or Behavior Therapy  [de-identified] : This writer met with patient for individual therapy. Session focused on processing improvement in communication in relationship with partner. Pt stated she observed her partner to be more communicative as well.  Processed anxiety around upcoming exam and pressure that she is experiencing. Helped pt to reframe self-care as a means of increasing overall confidence which will in turn help her to feel more confident heading into her exam. Will continue to explore and process in follow-up.   [Recommended Frequency of Visits: ____] : Recommended frequency of visits: [unfilled] [Return in ____ week(s)] : Return in [unfilled] week(s)

## 2024-06-04 NOTE — PHYSICAL EXAM
[Well groomed] : well groomed [Average] : average [Cooperative] : cooperative [Euthymic] : euthymic [Anxious] : anxious [Full] : full [Clear] : clear [Linear/Goal Directed] : linear/goal directed [None] : none [None Reported] : none reported [WNL] : within normal limits [FreeTextEntry8] : some stress and anxiety [de-identified] : congruent to mood

## 2024-06-11 ENCOUNTER — APPOINTMENT (OUTPATIENT)
Dept: PSYCHIATRY | Facility: CLINIC | Age: 34
End: 2024-06-11

## 2024-06-17 ENCOUNTER — APPOINTMENT (OUTPATIENT)
Dept: PSYCHIATRY | Facility: CLINIC | Age: 34
End: 2024-06-17
Payer: MEDICAID

## 2024-06-17 PROCEDURE — 90837 PSYTX W PT 60 MINUTES: CPT | Mod: 95

## 2024-06-17 NOTE — PHYSICAL EXAM
[Well groomed] : well groomed [Average] : average [Cooperative] : cooperative [Euthymic] : euthymic [Full] : full [Clear] : clear [Linear/Goal Directed] : linear/goal directed [None] : none [None Reported] : none reported [WNL] : within normal limits [FreeTextEntry8] : some stress and anxiety [de-identified] : congruent to mood

## 2024-06-17 NOTE — PLAN
[FreeTextEntry2] : Writer and pt collaboratively identified treatment goals which will be assessed and adjusted as needed and clinically indicated.  Problem 1: PTSD sx (hypervigilance, irritability, paranoia)  Goal 1: decrease PTSD symptoms by 30% as evidenced by PCL-5  Problem 2: Interpersonal effectiveness Goal 2: develop and utilize strategies to decrease paranoia in romantic relationship and improve interpersonal communications/skills  [Cognitive and/or Behavior Therapy] : Cognitive and/or Behavior Therapy  [de-identified] : This writer met with patient for individual therapy. Session focused on processing improvement in communication in relationship with partner and discussing recent exam that pt passed. Pt noted that she and her partner had a recent conversation in which he was the one inquiring about her social media. Discussed how people value platforms differently. Additionally, processed her use of skillful communication in this situation and how it allowed them to resolve the smaller issue. Will continue to explore and process in follow-up.   [Recommended Frequency of Visits: ____] : Recommended frequency of visits: [unfilled] [Return in ____ week(s)] : Return in [unfilled] week(s)

## 2024-06-17 NOTE — REASON FOR VISIT
[Patient preference] : as per patient preference [Telehealth (audio & video) - Individual/Group] : This visit was provided via telehealth using real-time 2-way audio visual technology. [Other Location: e.g. Home (Enter Location, City,State)___] : The provider was located at [unfilled]. [Home] : The patient, [unfilled], was located at home, [unfilled], at the time of the visit. [Verbal consent obtained from patient/other participant(s)] : Verbal consent for telehealth/telephonic services obtained from patient/other participant(s) [FreeTextEntry4] : 11:05am [FreeTextEntry5] : 12:00pm [Patient] : Patient [FreeTextEntry1] : relational stressors

## 2024-06-24 ENCOUNTER — APPOINTMENT (OUTPATIENT)
Dept: PSYCHIATRY | Facility: CLINIC | Age: 34
End: 2024-06-24
Payer: MEDICAID

## 2024-06-24 DIAGNOSIS — F41.1 GENERALIZED ANXIETY DISORDER: ICD-10-CM

## 2024-06-24 DIAGNOSIS — F43.10 POST-TRAUMATIC STRESS DISORDER, UNSPECIFIED: ICD-10-CM

## 2024-06-24 PROCEDURE — 90837 PSYTX W PT 60 MINUTES: CPT | Mod: 95

## 2024-06-25 PROBLEM — F41.1 GENERALIZED ANXIETY DISORDER: Status: ACTIVE | Noted: 2024-05-01

## 2024-06-25 PROBLEM — F43.10 PTSD (POST-TRAUMATIC STRESS DISORDER): Status: ACTIVE | Noted: 2024-02-07

## 2024-07-03 ENCOUNTER — APPOINTMENT (OUTPATIENT)
Dept: PSYCHIATRY | Facility: CLINIC | Age: 34
End: 2024-07-03
Payer: MEDICAID

## 2024-07-03 PROCEDURE — 90837 PSYTX W PT 60 MINUTES: CPT | Mod: 95

## 2024-07-10 ENCOUNTER — APPOINTMENT (OUTPATIENT)
Dept: PSYCHIATRY | Facility: CLINIC | Age: 34
End: 2024-07-10
Payer: MEDICAID

## 2024-07-10 PROCEDURE — 90837 PSYTX W PT 60 MINUTES: CPT | Mod: 95

## 2024-07-17 ENCOUNTER — APPOINTMENT (OUTPATIENT)
Dept: PSYCHIATRY | Facility: CLINIC | Age: 34
End: 2024-07-17
Payer: MEDICAID

## 2024-07-17 PROCEDURE — 90837 PSYTX W PT 60 MINUTES: CPT | Mod: 95

## 2024-07-25 ENCOUNTER — APPOINTMENT (OUTPATIENT)
Dept: PSYCHIATRY | Facility: CLINIC | Age: 34
End: 2024-07-25
Payer: MEDICAID

## 2024-07-25 DIAGNOSIS — F43.10 POST-TRAUMATIC STRESS DISORDER, UNSPECIFIED: ICD-10-CM

## 2024-07-25 PROCEDURE — 90837 PSYTX W PT 60 MINUTES: CPT | Mod: 95

## 2024-07-25 NOTE — REASON FOR VISIT
[Patient preference] : as per patient preference [Telehealth (audio & video) - Individual/Group] : This visit was provided via telehealth using real-time 2-way audio visual technology. [Other Location: e.g. Home (Enter Location, City,State)___] : The provider was located at [unfilled]. [Home] : The patient, [unfilled], was located at home, [unfilled], at the time of the visit. [Verbal consent obtained from patient/other participant(s)] : Verbal consent for telehealth/telephonic services obtained from patient/other participant(s) [FreeTextEntry4] : 10:07am [FreeTextEntry5] : 11:05am [Patient] : Patient [FreeTextEntry1] : relational issues/communication

## 2024-07-25 NOTE — END OF VISIT
[Duration of Psychotherapy Visit (minutes spent in synchronous communication): ____] : Duration of Psychotherapy Visit (minutes spent in synchronous communication): [unfilled] [Teletherapy Service Provided] : The services provided in this session were delivered via tele-therapy [Licensed Clinician] : Licensed Clinician

## 2024-07-25 NOTE — PHYSICAL EXAM
[Well groomed] : well groomed [Average] : average [Cooperative] : cooperative [Euthymic] : euthymic [Full] : full [Clear] : clear [Linear/Goal Directed] : linear/goal directed [None] : none [None Reported] : none reported [WNL] : within normal limits [Not applicable] : not applicable [FreeTextEntry8] : some stress and anxiety [de-identified] : congruent to mood

## 2024-07-25 NOTE — PLAN
[FreeTextEntry2] : Writer and pt collaboratively identified treatment goals which will be assessed and adjusted as needed and clinically indicated.  Problem 1: PTSD sx (hypervigilance, irritability, paranoia)  Goal 1: decrease PTSD symptoms by 30% as evidenced by PCL-5  Problem 2: Interpersonal effectiveness Goal 2: develop and utilize strategies to decrease paranoia in romantic relationship and improve interpersonal communications/skills  [Cognitive and/or Behavior Therapy] : Cognitive and/or Behavior Therapy  [de-identified] : This writer met with patient for individual therapy. Session focused on processing continued efforts made in increasing awareness of emotions and reactivity in relationship. Pt shared recent moments where she utilized distress tolerance skills and emotion regulation management.  Identified strategies that have been helpful, such as grounding, and encouraged pt continues to implement.  Will continue to explore and process in follow-up.   [Recommended Frequency of Visits: ____] : Recommended frequency of visits: [unfilled] [Return in ____ week(s)] : Return in [unfilled] week(s)

## 2024-07-31 ENCOUNTER — APPOINTMENT (OUTPATIENT)
Dept: PSYCHIATRY | Facility: CLINIC | Age: 34
End: 2024-07-31
Payer: MEDICAID

## 2024-07-31 DIAGNOSIS — F41.1 GENERALIZED ANXIETY DISORDER: ICD-10-CM

## 2024-07-31 PROCEDURE — 90837 PSYTX W PT 60 MINUTES: CPT | Mod: 95

## 2024-08-01 NOTE — PLAN
[FreeTextEntry2] : Writer and pt collaboratively identified treatment goals which will be assessed and adjusted as needed and clinically indicated.  Problem 1: PTSD sx (hypervigilance, irritability, paranoia)  Goal 1: decrease PTSD symptoms by 30% as evidenced by PCL-5  Problem 2: Interpersonal effectiveness Goal 2: develop and utilize strategies to decrease paranoia in romantic relationship and improve interpersonal communications/skills  [Cognitive and/or Behavior Therapy] : Cognitive and/or Behavior Therapy  [de-identified] : This writer met with patient for individual therapy. Session focused on processing continued efforts made in increasing awareness of emotions and reactivity in relationship. Pt shared recent moments where she utilized distress tolerance skills and emotion regulation management. Processed deeply, quality of relationship and areas in which she does not feel valued or important.  Will continue to explore and process in follow-up.   [Recommended Frequency of Visits: ____] : Recommended frequency of visits: [unfilled] [Return in ____ week(s)] : Return in [unfilled] week(s)

## 2024-08-01 NOTE — REASON FOR VISIT
[Patient preference] : as per patient preference [Telehealth (audio & video) - Individual/Group] : This visit was provided via telehealth using real-time 2-way audio visual technology. [Other Location: e.g. Home (Enter Location, City,State)___] : The provider was located at [unfilled]. [Home] : The patient, [unfilled], was located at home, [unfilled], at the time of the visit. [Verbal consent obtained from patient/other participant(s)] : Verbal consent for telehealth/telephonic services obtained from patient/other participant(s) [FreeTextEntry4] : 12:05pm [FreeTextEntry5] : 1:00pm [Patient] : Patient [FreeTextEntry1] : relational issues, anxiety

## 2024-08-01 NOTE — PHYSICAL EXAM
[Well groomed] : well groomed [Average] : average [Cooperative] : cooperative [Euthymic] : euthymic [Full] : full [Clear] : clear [Linear/Goal Directed] : linear/goal directed [None] : none [None Reported] : none reported [WNL] : within normal limits [Not applicable] : not applicable [FreeTextEntry8] : some stress and anxiety [de-identified] : congruent to mood

## 2024-08-09 ENCOUNTER — APPOINTMENT (OUTPATIENT)
Dept: PSYCHIATRY | Facility: CLINIC | Age: 34
End: 2024-08-09

## 2024-08-09 PROCEDURE — 90837 PSYTX W PT 60 MINUTES: CPT | Mod: 95

## 2024-08-13 ENCOUNTER — APPOINTMENT (OUTPATIENT)
Dept: PSYCHIATRY | Facility: CLINIC | Age: 34
End: 2024-08-13

## 2024-08-14 NOTE — PLAN
[FreeTextEntry2] : Writer and pt collaboratively identified treatment goals which will be assessed and adjusted as needed and clinically indicated.  Problem 1: PTSD sx (hypervigilance, irritability, paranoia)  Goal 1: decrease PTSD symptoms by 30% as evidenced by PCL-5  Problem 2: Interpersonal effectiveness Goal 2: develop and utilize strategies to decrease paranoia in romantic relationship and improve interpersonal communications/skills  [Cognitive and/or Behavior Therapy] : Cognitive and/or Behavior Therapy  [de-identified] : This writer met with patient for individual therapy. Session focused on processing recent trip with partner and family and various conflicts that arose. Pt shared moments where she utilized distress tolerance skills and emotion regulation management, and noted moments where strategies could have been applied. Processed deeply, quality of relationship and areas in which she is concerned with.  Will continue to explore and process in follow-up.   [Recommended Frequency of Visits: ____] : Recommended frequency of visits: [unfilled] [Return in ____ week(s)] : Return in [unfilled] week(s)

## 2024-08-14 NOTE — PHYSICAL EXAM
[Well groomed] : well groomed [Average] : average [Cooperative] : cooperative [Euthymic] : euthymic [Full] : full [Clear] : clear [Linear/Goal Directed] : linear/goal directed [None] : none [None Reported] : none reported [WNL] : within normal limits [Not applicable] : not applicable [FreeTextEntry8] : some stress and anxiety [de-identified] : congruent to mood

## 2024-08-14 NOTE — REASON FOR VISIT
[Patient preference] : as per patient preference [Telehealth (audio & video) - Individual/Group] : This visit was provided via telehealth using real-time 2-way audio visual technology. [Other Location: e.g. Home (Enter Location, City,State)___] : The provider was located at [unfilled]. [Home] : The patient, [unfilled], was located at home, [unfilled], at the time of the visit. [Verbal consent obtained from patient/other participant(s)] : Verbal consent for telehealth/telephonic services obtained from patient/other participant(s) [FreeTextEntry4] : 11:00am [FreeTextEntry5] : 11:55am [Patient] : Patient [FreeTextEntry1] : relational stress

## 2024-08-19 ENCOUNTER — APPOINTMENT (OUTPATIENT)
Dept: PSYCHIATRY | Facility: CLINIC | Age: 34
End: 2024-08-19
Payer: MEDICAID

## 2024-08-19 DIAGNOSIS — F41.1 GENERALIZED ANXIETY DISORDER: ICD-10-CM

## 2024-08-19 PROCEDURE — 90837 PSYTX W PT 60 MINUTES: CPT | Mod: 95

## 2024-08-19 NOTE — PLAN
[FreeTextEntry2] : Writer and pt collaboratively identified treatment goals which will be assessed and adjusted as needed and clinically indicated.  Problem 1: PTSD sx (hypervigilance, irritability, paranoia)  Goal 1: decrease PTSD symptoms by 30% as evidenced by PCL-5  Problem 2: Interpersonal effectiveness Goal 2: develop and utilize strategies to decrease paranoia in romantic relationship and improve interpersonal communications/skills  [Cognitive and/or Behavior Therapy] : Cognitive and/or Behavior Therapy  [de-identified] : This writer met with patient for individual therapy. Session focused on processing pt's sense of self as it relates to internal versus external validation. Writer and pt discussed how pt interacts with social media and deeply explored the meaning/value pt derives from the platforms. Identified ways in which pt can continue to strengthen sense of self- esteem, confidence, minimize comparison. Reviewed skills for pt to utilize in relationship: STOP, emotional regulation, checking in with self. Finally, spent time discussing upcoming [place]. Will continue to explore and process in follow-up.   [Recommended Frequency of Visits: ____] : Recommended frequency of visits: [unfilled] [Return in ____ week(s)] : Return in [unfilled] week(s)

## 2024-08-19 NOTE — REASON FOR VISIT
[Patient preference] : as per patient preference [Telehealth (audio & video) - Individual/Group] : This visit was provided via telehealth using real-time 2-way audio visual technology. [Other Location: e.g. Home (Enter Location, City,State)___] : The provider was located at [unfilled]. [Home] : The patient, [unfilled], was located at home, [unfilled], at the time of the visit. [Verbal consent obtained from patient/other participant(s)] : Verbal consent for telehealth/telephonic services obtained from patient/other participant(s) [FreeTextEntry4] : 12:03pm [FreeTextEntry5] : 1:03pm [Patient] : Patient [FreeTextEntry1] : relational stress; sense of self

## 2024-08-26 ENCOUNTER — APPOINTMENT (OUTPATIENT)
Dept: PSYCHIATRY | Facility: CLINIC | Age: 34
End: 2024-08-26

## 2024-08-26 PROCEDURE — 90837 PSYTX W PT 60 MINUTES: CPT | Mod: 95
